# Patient Record
Sex: FEMALE | Race: WHITE | Employment: UNEMPLOYED | URBAN - METROPOLITAN AREA
[De-identification: names, ages, dates, MRNs, and addresses within clinical notes are randomized per-mention and may not be internally consistent; named-entity substitution may affect disease eponyms.]

---

## 2017-02-10 PROCEDURE — 36415 COLL VENOUS BLD VENIPUNCTURE: CPT | Performed by: OBSTETRICS & GYNECOLOGY

## 2017-02-10 PROCEDURE — 84702 CHORIONIC GONADOTROPIN TEST: CPT | Performed by: OBSTETRICS & GYNECOLOGY

## 2017-02-10 PROCEDURE — 84144 ASSAY OF PROGESTERONE: CPT | Performed by: OBSTETRICS & GYNECOLOGY

## 2017-02-11 ENCOUNTER — HOSPITAL ENCOUNTER (EMERGENCY)
Facility: HOSPITAL | Age: 32
Discharge: HOME | End: 2017-02-11
Payer: COMMERCIAL

## 2017-02-11 ENCOUNTER — APPOINTMENT (OUTPATIENT)
Dept: ULTRASOUND IMAGING | Facility: HOSPITAL | Age: 32
End: 2017-02-11
Attending: NURSE PRACTITIONER
Payer: COMMERCIAL

## 2017-02-11 VITALS
HEART RATE: 75 BPM | OXYGEN SATURATION: 100 % | SYSTOLIC BLOOD PRESSURE: 114 MMHG | DIASTOLIC BLOOD PRESSURE: 67 MMHG | BODY MASS INDEX: 19.49 KG/M2 | TEMPERATURE: 99 F | RESPIRATION RATE: 18 BRPM | WEIGHT: 110 LBS | HEIGHT: 63 IN

## 2017-02-11 DIAGNOSIS — O03.9 MISCARRIAGE: Primary | ICD-10-CM

## 2017-02-11 LAB
ANTIBODY SCREEN: NEGATIVE
B-HCG SERPL-ACNC: 7136 MIU/ML
BASOPHILS # BLD: 0 K/UL (ref 0–0.2)
BASOPHILS NFR BLD: 1 %
EOSINOPHIL # BLD: 0 K/UL (ref 0–0.7)
EOSINOPHIL NFR BLD: 1 %
ERYTHROCYTE [DISTWIDTH] IN BLOOD BY AUTOMATED COUNT: 12.7 % (ref 11–15)
HCT VFR BLD AUTO: 43.9 % (ref 35–48)
HGB BLD-MCNC: 14.9 G/DL (ref 12–16)
LYMPHOCYTES # BLD: 1.9 K/UL (ref 1–4)
LYMPHOCYTES NFR BLD: 33 %
MCH RBC QN AUTO: 29.8 PG (ref 27–32)
MCHC RBC AUTO-ENTMCNC: 34 G/DL (ref 32–37)
MCV RBC AUTO: 87.8 FL (ref 80–100)
MONOCYTES # BLD: 0.5 K/UL (ref 0–1)
MONOCYTES NFR BLD: 9 %
NEUTROPHILS # BLD AUTO: 3.3 K/UL (ref 1.8–7.7)
NEUTROPHILS NFR BLD: 56 %
PLATELET # BLD AUTO: 214 K/UL (ref 140–400)
PMV BLD AUTO: 8.8 FL (ref 7.4–10.3)
RBC # BLD AUTO: 5 M/UL (ref 3.7–5.4)
RH BLOOD TYPE: POSITIVE
WBC # BLD AUTO: 5.8 K/UL (ref 4–11)

## 2017-02-11 PROCEDURE — 84702 CHORIONIC GONADOTROPIN TEST: CPT

## 2017-02-11 PROCEDURE — 36415 COLL VENOUS BLD VENIPUNCTURE: CPT

## 2017-02-11 PROCEDURE — 86901 BLOOD TYPING SEROLOGIC RH(D): CPT

## 2017-02-11 PROCEDURE — 86900 BLOOD TYPING SEROLOGIC ABO: CPT

## 2017-02-11 PROCEDURE — 86850 RBC ANTIBODY SCREEN: CPT

## 2017-02-11 PROCEDURE — 85025 COMPLETE CBC W/AUTO DIFF WBC: CPT

## 2017-02-11 PROCEDURE — 76801 OB US < 14 WKS SINGLE FETUS: CPT

## 2017-02-11 PROCEDURE — 76817 TRANSVAGINAL US OBSTETRIC: CPT

## 2017-02-11 PROCEDURE — 99284 EMERGENCY DEPT VISIT MOD MDM: CPT

## 2017-02-11 RX ORDER — PRENATAL VIT/IRON FUM/FOLIC AC 27MG-0.8MG
1 TABLET ORAL DAILY
COMMUNITY
End: 2019-02-07

## 2017-02-11 NOTE — ED INITIAL ASSESSMENT (HPI)
Pt presents to Ed with c/o felling like she just miscarriage Pt states is was having vaginal bleeding, and is currently 7 weeks in her pregnancy

## 2017-02-11 NOTE — ED PROVIDER NOTES
Patient Seen in: [facility] AND [facility] Emergency Department    History   Patient presents with:  Eval-G (gynecologic)    Stated Complaint: vag bleed 7 weeks preg    HPI    72-year-old female, 7 weeks pregnant, presents to the emergency department complaining 1310 Tympanic   SpO2 02/11/17 1310 100 %   O2 Device 02/11/17 1508 None (Room air)       Current:/67 mmHg  Pulse 75  Temp(Src) 98.7 °F (37.1 °C) (Tympanic)  Resp 18  Ht 160 cm (5' 3\")  Wt 49.896 kg  BMI 19.49 kg/m2  SpO2 100%  LMP 12/23/2016 Final result                 Please view results for these tests on the individual orders.    BLOOD TYPE, ABO AND RH D   ANTIBODY SCREEN   RAINBOW DRAW BLUE   RAINBOW DRAW GOLD   RAINBOW DRAW LAVENDER   RAINBOW DRAW LIGHT GREEN   RAINBOW DRAW LAVENDER TALL

## 2017-06-19 PROBLEM — Z87.59 HISTORY OF SPONTANEOUS ABORTION: Status: ACTIVE | Noted: 2017-06-19

## 2017-07-19 PROCEDURE — 84146 ASSAY OF PROLACTIN: CPT | Performed by: OBSTETRICS & GYNECOLOGY

## 2017-07-19 PROCEDURE — 84144 ASSAY OF PROGESTERONE: CPT | Performed by: OBSTETRICS & GYNECOLOGY

## 2017-12-13 PROCEDURE — 87340 HEPATITIS B SURFACE AG IA: CPT | Performed by: OBSTETRICS & GYNECOLOGY

## 2017-12-13 PROCEDURE — 88175 CYTOPATH C/V AUTO FLUID REDO: CPT | Performed by: OBSTETRICS & GYNECOLOGY

## 2017-12-13 PROCEDURE — 87491 CHLMYD TRACH DNA AMP PROBE: CPT | Performed by: OBSTETRICS & GYNECOLOGY

## 2017-12-13 PROCEDURE — 86762 RUBELLA ANTIBODY: CPT | Performed by: OBSTETRICS & GYNECOLOGY

## 2017-12-13 PROCEDURE — 86901 BLOOD TYPING SEROLOGIC RH(D): CPT | Performed by: OBSTETRICS & GYNECOLOGY

## 2017-12-13 PROCEDURE — 86900 BLOOD TYPING SEROLOGIC ABO: CPT | Performed by: OBSTETRICS & GYNECOLOGY

## 2017-12-13 PROCEDURE — 87591 N.GONORRHOEAE DNA AMP PROB: CPT | Performed by: OBSTETRICS & GYNECOLOGY

## 2017-12-13 PROCEDURE — 86850 RBC ANTIBODY SCREEN: CPT | Performed by: OBSTETRICS & GYNECOLOGY

## 2017-12-13 PROCEDURE — 87389 HIV-1 AG W/HIV-1&-2 AB AG IA: CPT | Performed by: OBSTETRICS & GYNECOLOGY

## 2017-12-13 PROCEDURE — 85025 COMPLETE CBC W/AUTO DIFF WBC: CPT | Performed by: OBSTETRICS & GYNECOLOGY

## 2017-12-13 PROCEDURE — 86780 TREPONEMA PALLIDUM: CPT | Performed by: OBSTETRICS & GYNECOLOGY

## 2018-01-02 PROBLEM — Z34.80 PRENATAL CARE OF MULTIGRAVIDA, ANTEPARTUM: Status: ACTIVE | Noted: 2018-01-02

## 2018-01-02 PROBLEM — O09.819 ENCOUNTER FOR SUPERVISION OF PREGNANCY RESULTING FROM ASSISTED REPRODUCTIVE TECHNOLOGY: Status: ACTIVE | Noted: 2018-01-02

## 2018-01-02 PROCEDURE — 87086 URINE CULTURE/COLONY COUNT: CPT | Performed by: OBSTETRICS & GYNECOLOGY

## 2018-01-11 PROCEDURE — 36415 COLL VENOUS BLD VENIPUNCTURE: CPT | Performed by: OBSTETRICS & GYNECOLOGY

## 2018-01-11 PROCEDURE — 81508 FTL CGEN ABNOR TWO PROTEINS: CPT | Performed by: OBSTETRICS & GYNECOLOGY

## 2018-01-22 PROBLEM — O46.8X2 SUBCHORIONIC HEMATOMA IN SECOND TRIMESTER, SINGLE OR UNSPECIFIED FETUS: Status: ACTIVE | Noted: 2018-01-22

## 2018-01-22 PROBLEM — O41.8X20 SUBCHORIONIC HEMATOMA IN SECOND TRIMESTER, SINGLE OR UNSPECIFIED FETUS: Status: ACTIVE | Noted: 2018-01-22

## 2018-04-09 PROBLEM — O09.299 HISTORY OF MACROSOMIA IN INFANT IN PRIOR PREGNANCY, CURRENTLY PREGNANT: Status: ACTIVE | Noted: 2018-04-09

## 2018-04-26 PROCEDURE — 87389 HIV-1 AG W/HIV-1&-2 AB AG IA: CPT | Performed by: OBSTETRICS & GYNECOLOGY

## 2018-04-26 PROCEDURE — 82950 GLUCOSE TEST: CPT | Performed by: OBSTETRICS & GYNECOLOGY

## 2018-04-26 PROCEDURE — 86780 TREPONEMA PALLIDUM: CPT | Performed by: OBSTETRICS & GYNECOLOGY

## 2018-07-20 PROBLEM — Z34.80 PRENATAL CARE OF MULTIGRAVIDA, ANTEPARTUM: Status: RESOLVED | Noted: 2018-01-02 | Resolved: 2018-07-20

## 2018-07-20 PROBLEM — O09.299 HISTORY OF MACROSOMIA IN INFANT IN PRIOR PREGNANCY, CURRENTLY PREGNANT: Status: RESOLVED | Noted: 2018-04-09 | Resolved: 2018-07-20

## 2018-07-20 PROBLEM — O46.8X2 SUBCHORIONIC HEMATOMA IN SECOND TRIMESTER, SINGLE OR UNSPECIFIED FETUS: Status: RESOLVED | Noted: 2018-01-22 | Resolved: 2018-07-20

## 2018-07-20 PROBLEM — O09.819 ENCOUNTER FOR SUPERVISION OF PREGNANCY RESULTING FROM ASSISTED REPRODUCTIVE TECHNOLOGY: Status: RESOLVED | Noted: 2018-01-02 | Resolved: 2018-07-20

## 2018-07-20 PROBLEM — O41.8X20 SUBCHORIONIC HEMATOMA IN SECOND TRIMESTER, SINGLE OR UNSPECIFIED FETUS: Status: RESOLVED | Noted: 2018-01-22 | Resolved: 2018-07-20

## 2018-10-01 PROBLEM — M54.6 ACUTE LEFT-SIDED THORACIC BACK PAIN: Status: ACTIVE | Noted: 2018-10-01

## 2019-12-06 ENCOUNTER — LAB ENCOUNTER (OUTPATIENT)
Dept: LAB | Facility: REFERENCE LAB | Age: 34
End: 2019-12-06
Attending: PHYSICIAN ASSISTANT
Payer: COMMERCIAL

## 2019-12-06 ENCOUNTER — OFFICE VISIT (OUTPATIENT)
Dept: INTEGRATIVE MEDICINE | Facility: CLINIC | Age: 34
End: 2019-12-06
Payer: COMMERCIAL

## 2019-12-06 VITALS
HEIGHT: 63 IN | HEART RATE: 69 BPM | DIASTOLIC BLOOD PRESSURE: 60 MMHG | SYSTOLIC BLOOD PRESSURE: 100 MMHG | BODY MASS INDEX: 20.37 KG/M2 | OXYGEN SATURATION: 99 % | WEIGHT: 115 LBS

## 2019-12-06 DIAGNOSIS — G89.29 CHRONIC NECK PAIN: ICD-10-CM

## 2019-12-06 DIAGNOSIS — Z87.59 HISTORY OF MISCARRIAGE: ICD-10-CM

## 2019-12-06 DIAGNOSIS — Z98.1 STATUS POST CERVICAL SPINAL FUSION: ICD-10-CM

## 2019-12-06 DIAGNOSIS — Z00.00 ROUTINE PHYSICAL EXAMINATION: ICD-10-CM

## 2019-12-06 DIAGNOSIS — Z00.00 ROUTINE PHYSICAL EXAMINATION: Primary | ICD-10-CM

## 2019-12-06 DIAGNOSIS — R53.82 CHRONIC FATIGUE: ICD-10-CM

## 2019-12-06 DIAGNOSIS — M54.2 CHRONIC NECK PAIN: ICD-10-CM

## 2019-12-06 PROBLEM — M54.6 ACUTE LEFT-SIDED THORACIC BACK PAIN: Status: RESOLVED | Noted: 2018-10-01 | Resolved: 2019-12-06

## 2019-12-06 PROBLEM — Q76.49: Status: ACTIVE | Noted: 2019-04-16

## 2019-12-06 PROCEDURE — 80050 GENERAL HEALTH PANEL: CPT | Performed by: PHYSICIAN ASSISTANT

## 2019-12-06 PROCEDURE — 86800 THYROGLOBULIN ANTIBODY: CPT | Performed by: PHYSICIAN ASSISTANT

## 2019-12-06 PROCEDURE — 82728 ASSAY OF FERRITIN: CPT | Performed by: PHYSICIAN ASSISTANT

## 2019-12-06 PROCEDURE — 84439 ASSAY OF FREE THYROXINE: CPT | Performed by: PHYSICIAN ASSISTANT

## 2019-12-06 PROCEDURE — 99385 PREV VISIT NEW AGE 18-39: CPT | Performed by: PHYSICIAN ASSISTANT

## 2019-12-06 PROCEDURE — 83036 HEMOGLOBIN GLYCOSYLATED A1C: CPT | Performed by: PHYSICIAN ASSISTANT

## 2019-12-06 PROCEDURE — 82627 DEHYDROEPIANDROSTERONE: CPT | Performed by: PHYSICIAN ASSISTANT

## 2019-12-06 PROCEDURE — 36415 COLL VENOUS BLD VENIPUNCTURE: CPT | Performed by: PHYSICIAN ASSISTANT

## 2019-12-06 PROCEDURE — 84481 FREE ASSAY (FT-3): CPT | Performed by: PHYSICIAN ASSISTANT

## 2019-12-06 PROCEDURE — 86628 CANDIDA ANTIBODY: CPT | Performed by: PHYSICIAN ASSISTANT

## 2019-12-06 PROCEDURE — 84140 ASSAY OF PREGNENOLONE: CPT | Performed by: PHYSICIAN ASSISTANT

## 2019-12-06 PROCEDURE — 86376 MICROSOMAL ANTIBODY EACH: CPT | Performed by: PHYSICIAN ASSISTANT

## 2019-12-06 NOTE — PROGRESS NOTES
Giuseppe Vazquez is a 30year old female. Patient presents with:  Establish Care  Physical      HPI:   Has two boys. Home. Had a cervical spinal fusion earlier this year. Lost some ROM. Still has pain and tightness. Has had two miscarriages.  On OCP f • Seasonal allergies    • Varicella        CURRENT MEDICATIONS:     Current Outpatient Medications   Medication Sig Dispense Refill   • Norgestim-Eth Estrad Triphasic (TRINESSA, 28,) 0.18/0.215/0.25 MG-35 MCG Oral Tab Take 1 tablet by mouth daily.  3 Packag SURGICAL HISTORY:     Past Surgical History:   Procedure Laterality Date   • Other surgical history  05/08/2019    Cervical Spinal Fusion    • Tonsillectomy         PHYSICAL EXAM:      12/06/19  0811   BP: 100/60   Pulse: 69   SpO2: 99%   Weight: 115 lb - DEHYDROEPIANDROSTERONE SULFATE; Future  - CANDIDA IGG/A/M AB PANEL; Future  - PREGNENOLONE BY MS/MS, SERUM; Future  - ASSAY, THYROID STIM HORMONE; Future  - FREE T3 (TRIIODOTHYRONINE); Future  - FREE T4 (FREE THYROXINE);  Future  - THYROID PEROXIDASE (TPO Triiodothyronine,Free,Serum [E]      Thyroxine, Free [E]      Thyroid Peroxidase (TPO) AB [E]      Thyroid Antithyroglobulin AB [E]      Ferritin [E]      Hemoglobin A1C (Glycohemoglobin) [E]      Patient Instructions   Michelle Mirza Hemp CBD oil [facility]

## 2019-12-06 NOTE — PATIENT INSTRUCTIONS
Justus Meneses Hemp CBD oil (Charmian Savage or Raw)    Where to Find: [employer]  Directions: 5 drops under the tongue twice daily  Why: Haroon Mimes is a blend of full spectrum hemp oil and other essential nutrients          Take 2 capsules jami

## 2019-12-12 NOTE — PROGRESS NOTES
Christine Zapata,     Your labs have returned. The Candida antibody levels were found to be elevated in your system. Candida is a yeast, or a type of microorganism, that is found in the whole mix of bacteria and yeast in and around your body.  It is normal in lifestyle that you follow. In order to improve this I recommend the following:      Caprylic Acid  Description: NOW® Caprylic Acid is a naturally derived nutrient also known as octanoic acid.  Caprylic Acid is a medium chain fatty acid (MCT) that is n recommend the following:  Pregnenolone 50mg by [employer]  Directions: 1 capsule daily  Why: to support healthy adrenal function; may support proper energy production in the body  Where: [email or web address] com    Your ferritin level is low.  Carmen Savage

## 2020-01-03 ENCOUNTER — OFFICE VISIT (OUTPATIENT)
Dept: INTEGRATIVE MEDICINE | Facility: CLINIC | Age: 35
End: 2020-01-03
Payer: COMMERCIAL

## 2020-01-03 VITALS
HEIGHT: 63 IN | DIASTOLIC BLOOD PRESSURE: 60 MMHG | HEART RATE: 64 BPM | BODY MASS INDEX: 21.91 KG/M2 | WEIGHT: 123.63 LBS | SYSTOLIC BLOOD PRESSURE: 108 MMHG | OXYGEN SATURATION: 96 % | TEMPERATURE: 98 F

## 2020-01-03 DIAGNOSIS — E53.9 VITAMIN B DEFICIENCY: ICD-10-CM

## 2020-01-03 DIAGNOSIS — M54.2 CERVICALGIA: ICD-10-CM

## 2020-01-03 DIAGNOSIS — Z98.1 STATUS POST CERVICAL SPINAL FUSION: Primary | ICD-10-CM

## 2020-01-03 DIAGNOSIS — E55.9 VITAMIN D DEFICIENCY: ICD-10-CM

## 2020-01-03 PROCEDURE — 99214 OFFICE O/P EST MOD 30 MIN: CPT | Performed by: PHYSICIAN ASSISTANT

## 2020-01-03 NOTE — PATIENT INSTRUCTIONS
Vitamin D/K2 by Butch Mcintosh    Directions: 12 drops in liquid daily or 5000 IU  Where to Find: www.donna. com  Why: Vitamin D/K2 Liquid is a convenient way to supplement with both vitamins D and K2, which offer greater support for bones and the immune Take 1 capsule a day    Find at [employer] or [employer]  KISHAN DIET  Emphasize In moderation Avoid   NON-STARCHY VEGETABLES  Artichokes  Asparagus  Broccoli  Montrose sprouts  Cabbage  Celery  Cucumber  Eggplant  Garlic (raw)  Kale  Onion Rosemary  Salt  Thyme  Turmeric  HEALTHY FATS & OILS  Coconut oil (virgin)  Flax oil  Olive oil  Sesame oil  NO-SUGAR SWEETENERS  Erythritol  Stevia  Xylitol  FERMENTED FOODS  Kefir  Olives  Sauerkraut  Yogurt  DRINKS  Chicory coffee  Filtered water  Herba

## 2020-01-03 NOTE — PROGRESS NOTES
Kimberly Ayala is a 30year old female. Patient presents with: Follow - Up: f/u from last visit       HPI:   Started some supplements. So far no issues. More energy. Starting to work on diet.      REVIEW OF SYSTEMS:   Review of Systems   Constituti Non-medical: Not on file    Tobacco Use      Smoking status: Never Smoker      Smokeless tobacco: Never Used    Substance and Sexual Activity      Alcohol use:  Yes        Alcohol/week: 3.0 - 4.0 standard drinks        Types: 3 - 4 Glasses of wine per Eyes: Pupils are equal, round, and reactive to light. Conjunctivae are normal. Right eye exhibits no discharge. Left eye exhibits no discharge. Neck: Normal range of motion. Neck supple. No thyromegaly present.    Cardiovascular: Normal rate, regular rhyt Why:B vitamins support adrenal, neurological, and stress-related functions in the body. They can also improve energy levels. Where: online ONLY at www. [employer] (or other Vitamin B complex at health store)          NAC (N-acetylcyseine)   Take 1 capsule Beets  Carrots  Corn (non-GMO)  Peas  Potatoes  Pumpkin  Yams  FRUITS  Apples  Apricots  Blackberries  Blueberries  Grapefruit  Oranges  Peach  Pears  Strawberries  Watermelon  GRAINS & PSEUDO-GRAINS  Amaranth  Black rice  Brown rice  Wild rice  RED MEATS

## 2022-12-01 ENCOUNTER — OFFICE VISIT (OUTPATIENT)
Dept: PHYSICAL MEDICINE AND REHAB | Facility: CLINIC | Age: 37
End: 2022-12-01
Payer: COMMERCIAL

## 2022-12-01 VITALS
DIASTOLIC BLOOD PRESSURE: 90 MMHG | BODY MASS INDEX: 20.91 KG/M2 | HEIGHT: 63 IN | HEART RATE: 70 BPM | WEIGHT: 118 LBS | SYSTOLIC BLOOD PRESSURE: 122 MMHG | OXYGEN SATURATION: 98 %

## 2022-12-01 DIAGNOSIS — M79.18 CERVICAL MYOFASCIAL PAIN SYNDROME: Primary | ICD-10-CM

## 2022-12-01 DIAGNOSIS — G95.9 CERVICAL MYELOPATHY (HCC): ICD-10-CM

## 2022-12-01 DIAGNOSIS — M54.12 CERVICAL RADICULOPATHY, CHRONIC: ICD-10-CM

## 2022-12-01 DIAGNOSIS — Z98.1 STATUS POST CERVICAL SPINAL FUSION: ICD-10-CM

## 2022-12-01 PROCEDURE — 3080F DIAST BP >= 90 MM HG: CPT | Performed by: PHYSICAL MEDICINE & REHABILITATION

## 2022-12-01 PROCEDURE — 99203 OFFICE O/P NEW LOW 30 MIN: CPT | Performed by: PHYSICAL MEDICINE & REHABILITATION

## 2022-12-01 PROCEDURE — 3008F BODY MASS INDEX DOCD: CPT | Performed by: PHYSICAL MEDICINE & REHABILITATION

## 2022-12-01 PROCEDURE — 3074F SYST BP LT 130 MM HG: CPT | Performed by: PHYSICAL MEDICINE & REHABILITATION

## 2022-12-01 NOTE — PATIENT INSTRUCTIONS
Start physical therapy at [facility], they will keep me posted with your progress. Trial Voltaren Gel (Diclofenac) over the counter to the painful area 3x per day  At follow up in 4-6 weeks we will talk about trial of a muscle relaxer if we feel it is indicated, we can discuss further interventional options as needed as well.

## 2023-01-12 ENCOUNTER — MED REC SCAN ONLY (OUTPATIENT)
Dept: PHYSICAL MEDICINE AND REHAB | Facility: CLINIC | Age: 38
End: 2023-01-12

## 2023-01-31 ENCOUNTER — PATIENT MESSAGE (OUTPATIENT)
Dept: PHYSICAL MEDICINE AND REHAB | Facility: CLINIC | Age: 38
End: 2023-01-31

## 2023-01-31 NOTE — TELEPHONE ENCOUNTER
From: Dorinda Kraft  To: Shanelle Magallanes DO  Sent: 1/31/2023 1:30 PM CST  Subject: Diclofenac    Hello! Just wanted to check in to see if diclofenac can be used on my neck/shoulder. On the instructions it says to not use it in that location. Thanks!

## (undated) NOTE — ED AVS SNAPSHOT
[facility] Emergency Department    [address] [place] [address]    Phone:  [phone]    Fax:  Cynthia   MRN: [de-identified]    Department:  [facility] Emergency Department   Date of Visit: please call our  at [phone]. Si tiene problemas para programar wilder valdemar de seguimiento según lo indicado, llame al encargado de pop al [phone].     It is our goal to assure that you are completely satisfied with every aspect o doctor until you can check with your doctor. Please bring the medication list to your next doctor's appointment. Any imaging studies and labs completed today can be reviewed in your [employer] account.   You may have had testing done that requires us to co You can access your MyChart to more actively manage your health care and view more details from this visit by going to [email or web address].   If you've recently had a stay at the Hospital you can access your discharge instructions in [address] by susan

## (undated) NOTE — LETTER
[address] [place] [address]   Date:   12/1/2022     Name:   Haydee Hernandez    YOB: 1985   MRN:   [id number]       [facility]? Terri the areas on your body where you feel the described sensations. Use the appropriate symbol. Rose Carrasquilloin the areas of radiation. Include all affected areas. Just to complete the picture, please draw in the face. ACHE:  ^ ^ ^   NUMBNESS:  0000   PINS & NEEDLES:  = = = =                              ^ ^ ^                       0000              = = = =                                    ^ ^ ^                       0000            = = = =      BURNING:  XXXX   STABBING: ////                  XXXX                ////                         XXXX          ////     Please terri the line below indicating your degree of pain right now  with 0 being no pain 10 being the worst pain possible.                                          0             1             2              3             4              5              6              7             8             9             10         Patient Signature:

## (undated) NOTE — LETTER
[address] [place], [place]  [address] [place] [id number] [phone]            12.12.2019      [facility],     Your labs have returned.      The Candida antibody levels were found to be elevated in yo lifestyle for you to follow that will support and maintain optimal health.  It is important that you feel good, enjoy life and food and don't feel like you are being imprisoned by any diet or lifestyle that you follow.         In order to improve this I rec function. The adrenal glands are responsible for stress, salt and sex hormone production. Low levels of adrenal function may contribute to fatigue and/or other hormonal symptoms.  In order to support full adrenal function I recommend the following:   Ilene York

## (undated) NOTE — LETTER
[address]  [address] [facility]  [address] [facility] [id number] [address],     Your labs have returned. The Candida antibody levels were found to be elevated in your system.    C lifestyle for you to follow that will support and maintain optimal health.  It is important that you feel good, enjoy life and food and don't feel like you are being imprisoned by any diet or lifestyle that you follow.         In order to improve this I rec function. The adrenal glands are responsible for stress, salt and sex hormone production. Low levels of adrenal function may contribute to fatigue and/or other hormonal symptoms.  In order to support full adrenal function I recommend the following:   Patricia Vasquez

## (undated) NOTE — ED AVS SNAPSHOT
[facility] Emergency Department    [address] [address]    Phone:  [phone]    Fax:  Cynthia   MRN: [de-identified]    Department:  [facility] Emergency Department   Date of Visit: and Class Registration line at [phone] or find a doctor online by visiting [email or web address].    IF THERE IS ANY CHANGE OR WORSENING OF YOUR CONDITION, CALL YOUR PRIMARY CARE PHYSICIAN AT ONCE OR RETURN IMMEDIATELY TO [address] [place] [address].     If